# Patient Record
Sex: FEMALE | Race: WHITE | NOT HISPANIC OR LATINO | Employment: STUDENT | URBAN - NONMETROPOLITAN AREA
[De-identification: names, ages, dates, MRNs, and addresses within clinical notes are randomized per-mention and may not be internally consistent; named-entity substitution may affect disease eponyms.]

---
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
## 2024-06-03 DIAGNOSIS — L70.9 ACNE, UNSPECIFIED ACNE TYPE: ICD-10-CM

## 2024-06-03 DIAGNOSIS — N92.1 MENORRHAGIA WITH IRREGULAR CYCLE: ICD-10-CM

## 2024-07-18 ENCOUNTER — OFFICE VISIT (OUTPATIENT)
Dept: OBSTETRICS AND GYNECOLOGY | Age: 19
End: 2024-07-18
Payer: COMMERCIAL

## 2024-07-18 VITALS
HEIGHT: 63 IN | WEIGHT: 135 LBS | RESPIRATION RATE: 18 BRPM | BODY MASS INDEX: 23.92 KG/M2 | SYSTOLIC BLOOD PRESSURE: 120 MMHG | DIASTOLIC BLOOD PRESSURE: 70 MMHG

## 2024-07-18 DIAGNOSIS — N92.1 MENORRHAGIA WITH IRREGULAR CYCLE: ICD-10-CM

## 2024-07-18 DIAGNOSIS — Z00.00 WELL WOMAN EXAM WITHOUT GYNECOLOGICAL EXAM: Primary | ICD-10-CM

## 2024-07-18 DIAGNOSIS — L70.9 ACNE, UNSPECIFIED ACNE TYPE: ICD-10-CM

## 2024-07-18 PROBLEM — N39.44 NOCTURNAL ENURESIS: Status: ACTIVE | Noted: 2024-07-18

## 2024-07-18 RX ORDER — NORGESTIMATE AND ETHINYL ESTRADIOL 0.25-0.035
1 KIT ORAL DAILY
Qty: 84 TABLET | Refills: 3 | Status: SHIPPED | OUTPATIENT
Start: 2024-07-18

## 2024-07-18 NOTE — PROGRESS NOTES
Subjective   Jacinda Hare is a 18 y.o. female  YOB: 2005        Chief Complaint   Patient presents with    Gynecologic Exam     Patient presents today for well woman exam. And OCP refill.        Gynecologic Exam  The patient's pertinent negatives include no pelvic pain. Pertinent negatives include no abdominal pain, back pain, constipation, diarrhea, dysuria, fever, frequency, hematuria, nausea, rash, sore throat, urgency or vomiting.       The following portions of the patient's history were reviewed and updated as appropriate: allergies, current medications, past family history, past medical history, past social history, past surgical history, and problem list.    No Known Allergies    Past Medical History:   Diagnosis Date    Nasal septal deviation 05/18/2022    Personal history of COVID-19 01/2022       Family History   Problem Relation Age of Onset    No Known Problems Mother     Hypertension Father     No Known Problems Brother     Diabetes Paternal Grandmother     Stroke Paternal Grandmother     Hypertension Paternal Grandmother     No Known Problems Brother     Breast cancer Neg Hx     Colon cancer Neg Hx     Ovarian cancer Neg Hx     Uterine cancer Neg Hx     Melanoma Neg Hx        Social History     Socioeconomic History    Marital status: Single   Tobacco Use    Smoking status: Never    Smokeless tobacco: Never   Vaping Use    Vaping status: Never Used   Substance and Sexual Activity    Alcohol use: Never    Drug use: Never    Sexual activity: Never     Partners: Male         Current Outpatient Medications:     norgestimate-ethinyl estradiol (Sprintec 28) 0.25-35 MG-MCG per tablet, Take 1 tablet by mouth Daily., Disp: 84 tablet, Rfl: 3    Patient's last menstrual period was 07/09/2024 (exact date).    Sexual History:           Could not be calculated    Past Surgical History:   Procedure Laterality Date    KNEE ARTHROSCOPY W/ PLICA EXCISION Right     KNEE CARTILAGE SURGERY Right  02/04/2021    NASAL FRACTURE CLOSED REDUCTION N/A 06/20/2022    Procedure: NASAL FRACTURE CLOSED REDUCTION;  Surgeon: Harpreet Garcias MD;  Location:  [facility] OR;  Service: ENT;  Laterality: N/A;    TEAR DUCT SURGERY      TONSILLECTOMY Bilateral 07/24/2023    Procedure: TONSILLECTOMY;  Surgeon: Harpreet Garcias MD;  Location:  [facility] OR;  Service: ENT;  Laterality: Bilateral;       Review of Systems   Constitutional:  Negative for activity change, appetite change, fatigue, fever, unexpected weight gain and unexpected weight loss.   HENT:  Negative for congestion, ear pain, hearing loss, nosebleeds, rhinorrhea, sore throat, tinnitus and trouble swallowing.    Eyes:  Negative for blurred vision, pain, discharge, itching and visual disturbance.   Respiratory:  Negative for apnea, chest tightness, shortness of breath and wheezing.    Cardiovascular:  Negative for chest pain and leg swelling.   Gastrointestinal:  Negative for abdominal pain, blood in stool, constipation, diarrhea, nausea, vomiting and GERD.   Endocrine: Negative for heat intolerance, polydipsia and polyuria.   Genitourinary: Negative.  Negative for breast lump, decreased libido, difficulty urinating, dyspareunia, dysuria, frequency, genital sores, hematuria, menstrual problem, pelvic pain, urgency, urinary incontinence and vaginal pain.   Musculoskeletal:  Negative for arthralgias, back pain, joint swelling and myalgias.   Skin:  Negative for color change, rash and skin lesions.   Allergic/Immunologic: Negative for environmental allergies, food allergies and immunocompromised state.   Neurological:  Negative for dizziness, tremors, seizures, syncope, facial asymmetry, numbness and headache.   Hematological:  Negative for adenopathy. Does not bruise/bleed easily.   Psychiatric/Behavioral:  Negative for agitation, hallucinations, sleep disturbance, suicidal ideas and depressed mood. The patient is not nervous/anxious.        Objective   Physical  "Exam  Vitals and nursing note reviewed.   Constitutional:       Appearance: She is well-developed.   HENT:      Head: Normocephalic.   Eyes:      Pupils: Pupils are equal, round, and reactive to light.   Cardiovascular:      Rate and Rhythm: Normal rate and regular rhythm.   Pulmonary:      Effort: Pulmonary effort is normal.      Breath sounds: Normal breath sounds.   Abdominal:      Palpations: Abdomen is soft.   Musculoskeletal:         General: Normal range of motion.      Cervical back: Normal range of motion.   Skin:     General: Skin is warm and dry.   Neurological:      Mental Status: She is alert and oriented to person, place, and time.   Psychiatric:         Behavior: Behavior normal.           Vitals:    07/18/24 1304   BP: 120/70   Resp: 18   Weight: 61.2 kg (135 lb)   Height: 160 cm (63\")       Diagnoses and all orders for this visit:    1. Well woman exam without gynecological exam (Primary)  Comments:  Normal well woman exam.    2. Menorrhagia with irregular cycle  Comments:  Patient doing well on Sprintec related to menorrhagia and acne and wants to remain on it refill sent to pharmacy.  Orders:  -     norgestimate-ethinyl estradiol (Sprintec 28) 0.25-35 MG-MCG per tablet; Take 1 tablet by mouth Daily.  Dispense: 84 tablet; Refill: 3    3. Acne, unspecified acne type  -     norgestimate-ethinyl estradiol (Sprintec 28) 0.25-35 MG-MCG per tablet; Take 1 tablet by mouth Daily.  Dispense: 84 tablet; Refill: 3        Normal GYN exam. Will have lab work here. Encouraged SBE.  Pt is aware how to do self breast exam and the importance of same. Discussed weight management and importance of maintaining a healthy weight. Discussed Vitamin D intake and the importance of adequate vitamin D for both bone health and a healthy immune system.  Discussed daily exercise and the importance of same in regards to a healthy heart as well as helping to maintain her weight and improving her mental health.  Body mass index is " 23.91 kg/m². Colonoscopy is not age appropriate.  Mammogram will be scheduled at not age appropriate. Pap smear is done per ASCCP guidelines.    Pediatric BMI = 72 %ile (Z= 0.57) based on CDC (Girls, 2-20 Years) BMI-for-age based on BMI available as of 7/18/2024.. BMI is within normal parameters. No other follow-up for BMI required.             Non-Smoker    MyChart Instructions Given

## 2024-08-08 DIAGNOSIS — L70.9 ACNE, UNSPECIFIED ACNE TYPE: ICD-10-CM

## 2024-08-08 DIAGNOSIS — N92.1 MENORRHAGIA WITH IRREGULAR CYCLE: ICD-10-CM

## 2024-08-14 ENCOUNTER — PATIENT MESSAGE (OUTPATIENT)
Dept: OBSTETRICS AND GYNECOLOGY | Age: 19
End: 2024-08-14
Payer: COMMERCIAL

## 2024-08-14 DIAGNOSIS — L70.9 ACNE, UNSPECIFIED ACNE TYPE: ICD-10-CM

## 2024-08-14 DIAGNOSIS — N92.1 MENORRHAGIA WITH IRREGULAR CYCLE: ICD-10-CM

## 2024-08-15 RX ORDER — NORGESTIMATE AND ETHINYL ESTRADIOL 0.25-0.035
1 KIT ORAL DAILY
Qty: 84 TABLET | Refills: 3 | Status: SHIPPED | OUTPATIENT
Start: 2024-08-15

## 2024-08-15 NOTE — TELEPHONE ENCOUNTER
From: Jacinda Hare  To: Mercy Andrew  Sent: 8/14/2024 10:43 PM CDT  Subject: Birth Control Refill    Hello. At my last visit we changed my prescription to be filled through [facility], a mail in prescription. However, I am having issues with contacting them and getting the account set up in order to actually fill and receive the prescription. I was wanting to see if I could change the prescription to be filled at [facility] ([address] [place] [address], [place] [phone]. Thank you!

## 2024-10-04 NOTE — PROGRESS NOTES
Chief Complaint   Patient presents with   • Well Child     16 year        Jacinda Hare female 16 y.o. 2 m.o.      History was provided by the mother.    Immunization History   Administered Date(s) Administered   • COVID-19 (PFIZER) 07/30/2021, 08/27/2021   • DTaP 2004, 02/09/2005, 04/11/2005, 05/17/2006, 10/16/2008   • Flu Vaccine Quad PF >36MO 10/11/2018, 11/27/2019   • FluLaval/Fluarix/Fluzone >6 11/27/2019, 12/04/2020   • HPV Quadrivalent 10/11/2018   • Hepatitis A 10/16/2008, 06/09/2009   • Hepatitis B 2004, 2004, 02/09/2005, 04/11/2005   • HiB 2004, 02/09/2005, 04/11/2005, 10/18/2005   • Hpv9 10/11/2018, 06/05/2019, 08/06/2019   • IPV 2004, 02/09/2005, 04/11/2005, 10/16/2008   • MMR 10/18/2005, 10/16/2008   • Meningococcal Conjugate 10/09/2015, 12/04/2020   • PEDS-Pneumococcal Conjugate (PCV7) 2004, 02/09/2005, 04/11/2005, 10/18/2005   • Tdap 10/09/2015   • Varicella 10/18/2005, 10/16/2008       The following portions of the patient's history were reviewed and updated as appropriate: allergies, current medications, past family history, past medical history, past social history, past surgical history and problem list.     Current Outpatient Medications   Medication Sig Dispense Refill   • norgestimate-ethinyl estradiol (Sprintec 28) 0.25-35 MG-MCG per tablet Take 1 tablet by mouth Daily. 28 tablet 12   • fluticasone (Flonase) 50 MCG/ACT nasal spray 2 sprays into the nostril(s) as directed by provider Daily. 9.9 mL 5   • loratadine (Claritin) 10 MG tablet Take 1 tablet by mouth Daily. 30 tablet 5   • NON FORMULARY Floracream Compounded Vaginal Probiotic  1gr  Vaginally QD x 2 weeks, then as needed nightly after physical activity. 30 g 5     No current facility-administered medications for this visit.       No Known Allergies      Current Issues:  Current concerns include tonsil stones.    Review of Nutrition:  Current diet: regular  Balanced diet? yes  Exercise:  "daily  Dentist: twice a year    Social Screening:  Discipline concerns? no  Concerns regarding behavior with peers? no  School performance: doing well; no concerns  Grade: 11th grade  Secondhand smoke exposure? no  Sexual activity: no  Helmet Use:  yes  Seat Belt Use: yes  Sunscreen Use:  yes  Smoke Detectors:  yes  Alcohol or drug use: no     Review of Systems   Constitutional: Negative for appetite change, fatigue and fever.   HENT: Negative for congestion, ear pain, hearing loss, rhinorrhea, sneezing and sore throat.         Tonsils stones   Eyes: Negative for discharge, redness and visual disturbance.   Respiratory: Negative for cough and wheezing.    Cardiovascular: Negative for chest pain and palpitations.   Gastrointestinal: Negative for abdominal pain, constipation, diarrhea, nausea and vomiting.   Genitourinary: Negative for dysuria, frequency and hematuria.   Musculoskeletal: Negative for arthralgias and myalgias.   Skin: Negative for rash.   Neurological: Negative for headache.   Hematological: Negative for adenopathy.   Psychiatric/Behavioral: Negative for behavioral problems and sleep disturbance.              /80   Pulse 89   Temp 98.1 °F (36.7 °C)   Ht 160 cm (63\")   Wt 54.9 kg (121 lb)   SpO2 100%   BMI 21.43 kg/m²          Physical Exam  Vitals reviewed. Exam conducted with a chaperone present.   Constitutional:       Appearance: She is well-developed.   HENT:      Head: Normocephalic.      Right Ear: Tympanic membrane normal.      Left Ear: Tympanic membrane normal.      Nose: Nose normal. No rhinorrhea.      Right Sinus: No maxillary sinus tenderness or frontal sinus tenderness.      Left Sinus: No maxillary sinus tenderness or frontal sinus tenderness.   Eyes:      General: Lids are normal.      Conjunctiva/sclera: Conjunctivae normal.      Pupils: Pupils are equal, round, and reactive to light.   Cardiovascular:      Rate and Rhythm: Normal rate and regular rhythm.      Heart sounds: " Normal heart sounds.   Pulmonary:      Effort: Pulmonary effort is normal. No respiratory distress.      Breath sounds: Normal breath sounds. No wheezing, rhonchi or rales.   Abdominal:      General: Bowel sounds are normal. There is no distension.      Palpations: Abdomen is soft.      Tenderness: There is no abdominal tenderness. There is no guarding or rebound.   Musculoskeletal:         General: Normal range of motion.      Cervical back: Normal range of motion and neck supple.      Thoracic back: Normal. No deformity.   Lymphadenopathy:      Cervical: No cervical adenopathy.   Skin:     General: Skin is warm and dry.      Findings: No rash.   Neurological:      Mental Status: She is alert and oriented to person, place, and time.   Psychiatric:         Speech: Speech normal.         Behavior: Behavior normal.         Thought Content: Thought content normal.         Judgment: Judgment normal.                 Healthy 16 y.o.  well child.        1. Anticipatory guidance discussed.  Specific topics reviewed: bicycle helmets, drugs, ETOH, and tobacco, puberty and seat belts.    The patient and parent(s) were instructed in water safety, burn safety, firearm safety, and stranger safety.  Helmet use was indicated for any bike riding, scooter, rollerblades, skateboards, or skiing. They were instructed that children should sit  in the back seat of the car, if there is an air bag, until age 13.  Encouraged annual dental visits and appropriate dental hygiene.  Encouraged participation in household chores. Recommended limiting screen time to <2hrs daily and encouraging at least one hour of active play daily.  If participating in sports, use proper personal safety equipment.    Age appropriate counseling provided on smoking, alcohol use, illicit drug use, and sexual activity.    2.  Weight management:  The patient was counseled regarding behavior modifications, nutrition and physical activity.    3. Development: appropriate  for age    4.Immunizations: discussed risk/benefits to vaccinations ordered today, reviewed components of the vaccine, discussed CDC VIS, discussed informed consent and informed consent obtained. Counseled regarding s/s or adverse effects and when to seek medical attention.  Patient/family was allowed to accept or refuse vaccine. Questions answered to satisfactory state of patient. We reviewed typical age appropriate and seasonally appropriate vaccinations. Reviewed immunization history and updated state vaccination form as needed.    Assessment/Plan     Diagnoses and all orders for this visit:    1. Encounter for well child visit at 16 years of age (Primary)  -     POC Hemoglobin  -     FluLaval/Fluarix/Fluzone >6 Months    2. Tonsil stone  -     loratadine (Claritin) 10 MG tablet; Take 1 tablet by mouth Daily.  Dispense: 30 tablet; Refill: 5  -     fluticasone (Flonase) 50 MCG/ACT nasal spray; 2 sprays into the nostril(s) as directed by provider Daily.  Dispense: 9.9 mL; Refill: 5          Return in about 1 year (around 12/28/2022).              Yes

## 2025-05-21 ENCOUNTER — TELEPHONE (OUTPATIENT)
Dept: CARDIOLOGY | Facility: CLINIC | Age: 20
End: 2025-05-21
Payer: COMMERCIAL

## 2025-05-21 NOTE — TELEPHONE ENCOUNTER
Record request for cardiac records of last 2 visit notes, heart monitor report, stress test, and echo for the past year faxed to Dr. Jaguar Kaiser @ [phone].  His phone is:  [phone].  [facility] in [place], [place] phone is:  [phone].  Chelly Stoner MA

## 2025-06-03 ENCOUNTER — OFFICE VISIT (OUTPATIENT)
Dept: CARDIOLOGY | Facility: CLINIC | Age: 20
End: 2025-06-03
Payer: COMMERCIAL

## 2025-06-03 VITALS
HEART RATE: 102 BPM | DIASTOLIC BLOOD PRESSURE: 76 MMHG | HEIGHT: 63 IN | OXYGEN SATURATION: 98 % | WEIGHT: 137 LBS | BODY MASS INDEX: 24.27 KG/M2 | SYSTOLIC BLOOD PRESSURE: 106 MMHG

## 2025-06-03 DIAGNOSIS — R00.2 PALPITATIONS: ICD-10-CM

## 2025-06-03 DIAGNOSIS — G90.9 AUTONOMIC DYSFUNCTION: Primary | ICD-10-CM

## 2025-06-03 PROCEDURE — 99204 OFFICE O/P NEW MOD 45 MIN: CPT | Performed by: HOSPITALIST

## 2025-06-03 PROCEDURE — 93000 ELECTROCARDIOGRAM COMPLETE: CPT | Performed by: HOSPITALIST

## 2025-06-03 RX ORDER — LEVOCETIRIZINE DIHYDROCHLORIDE 5 MG/1
5 TABLET, FILM COATED ORAL EVERY EVENING
COMMUNITY

## 2025-06-03 RX ORDER — PROPRANOLOL HYDROCHLORIDE 10 MG/1
5 TABLET ORAL DAILY
Qty: 45 TABLET | Refills: 3 | Status: SHIPPED | OUTPATIENT
Start: 2025-06-03

## 2025-06-03 RX ORDER — DIGOXIN 125 MCG
62.5 TABLET ORAL
COMMUNITY
Start: 2025-03-28 | End: 2025-06-03

## 2025-06-03 NOTE — PROGRESS NOTES
Reason For Visit:  Palpitations    Subjective        Jacinda Hare is a 19 y.o. female with the below pertinent PMH  who is referred for the above issue.    I reviewed a cardiology note from [place] from 5/21/2025.  This stated that the patient had started recently experiencing episodes of tachycardia with mild activity.  She had been seen in urgent care once and prescribed propranolol, but stopped it because it dropped her BP too much and made her feel fatigued all day.  She also was seen in the ED where her TSH and free T4 were within normal limits but she had mild leukocytosis with WBC 11.  A cardiac monitor revealed an average HR in the 80s.  She had been started on digoxin 125 mcg daily, which she had stopped due to reporting that her HR would drop into the 40s.  She therefore was advised to take digoxin 62.5 mcg daily.    Today, the patient states that she started having issues in January.  Prior to that, she had been quite active with exercise and playing intermural sports without any significant issues.  In January she started to have random episodes of tachycardia with palpitations, shortness of breath, and chest discomfort.  She states that these occur randomly without clear triggers.  They can occur while she is walking or while she is seated.  She does not consistently notice them triggered by orthostatic changes.  She has not noticed any issues while she has been laying down.  She corroborated the above history.  She does state that since starting the 62.5 mcg of digoxin her HR has a tendency to run a little lower and symptoms may be very slightly better but not much of a difference.    ROS: Pertinent findings are included above.    Cardiac Studies  Cardiac monitor 3/16/2021: Predominantly sinus rhythm with average rate 81 bpm () BPM.  No atrial fibrillation or other significant arrhythmias.  No significant ectopy noted.  7-day cardiac event monitor 2/27/2025: Average rate 87 bpm.  Symptoms  "corresponded with sinus tachycardia.  No arrhythmias.  Echo 3/28/2025: LVEF 54-74%, normal diastolic function, normal RV size/function, normal biatrial size, no hemodynamically significant valvular dysfunction.  Stress echo 3/28/2025: Low risk for ischemia.    Pertinent PMH  Palpitations    Pertinent past medical, surgical, family, and social history were reviewed and updated in the EMR.      Current Outpatient Medications:     levocetirizine (XYZAL) 5 MG tablet, Take 1 tablet by mouth Every Evening., Disp: , Rfl:     norgestimate-ethinyl estradiol (Sprintec 28) 0.25-35 MG-MCG per tablet, Take 1 tablet by mouth Daily., Disp: 84 tablet, Rfl: 3    propranolol (INDERAL) 10 MG tablet, Take 0.5 tablets by mouth Daily., Disp: 45 tablet, Rfl: 3     Objective   Vital Signs:  /76   Pulse 102   Ht 160 cm (63\")   Wt 62.1 kg (137 lb)   SpO2 98%   BMI 24.27 kg/m²   Estimated body mass index is 24.27 kg/m² as calculated from the following:    Height as of this encounter: 160 cm (63\").    Weight as of this encounter: 62.1 kg (137 lb).      Constitutional:       Appearance: Healthy appearance. Not in distress.   Neck:      Vascular: JVD normal.   Pulmonary:      Effort: Pulmonary effort is normal.      Breath sounds: Normal breath sounds.   Cardiovascular:      Normal rate. Regular rhythm.      Murmurs: There is no murmur.      No gallop.  No click. No rub.   Edema:     Peripheral edema absent.   Abdominal:      General: There is no distension.      Palpations: Abdomen is soft.      Tenderness: There is no abdominal tenderness.   Skin:     General: Skin is warm and dry.   Neurological:      Mental Status: Alert and oriented to person, place and time.        Result Review :             ECG 12 Lead    Date/Time: 6/3/2025 3:50 PM  Performed by: Roberto Carlos Priest MD    Authorized by: Roberto Carlos Priest MD  Comparison: compared with previous ECG from 3/16/2021  Rhythm: sinus rhythm  Rate: normal  BPM: 88  Conduction: conduction " normal  QRS axis: right  Clinical impression comment: Borderline            Assessment and Plan   Diagnoses and all orders for this visit:    1. Autonomic dysfunction (Primary)    2. Palpitations    Other orders  -     propranolol (INDERAL) 10 MG tablet; Take 0.5 tablets by mouth Daily.  Dispense: 45 tablet; Refill: 3  -     ECG 12 Lead    Based on her symptoms I do believe that she has some autonomic dysfunction.  That being said, she does not clearly meet diagnostic criteria for inappropriate sinus tachycardia.  POTS is a consideration although her symptom description does not classically fit the picture for this either.  It does not appear that she is achieving adequate benefit from digoxin, so we agreed to discontinue this.  I counseled her  extensively on conservative measures including increasing fluid intake (currently drinking around 40 ounces of water daily) with goal to try to drink at minimum 64 ounces and ideally closer to 100 ounces of water daily.  Also counseled her to increase sodium intake and consider thigh-high compression stocking use.  I am sending a prescription for propranolol 5 mg daily for her to try if she is still having symptoms after several weeks of attempting conservative measures.  Hopefully she will be able to tolerate the lower dose of propranolol in conjunction with increased salt intake without a significant drop in BP.  We discussed the possibility of referral to EP but will defer this for now until reassessment with these measures.    Follow Up   Return in about 6 weeks (around 7/15/2025).  Patient was given instructions and counseling regarding her condition or for health maintenance advice. Please see specific information pulled into the AVS if appropriate.       Part of this note may be an electronic transcription/translation of spoken language to printed text using the Dragon Dictation System.    [employer]

## 2025-06-06 ENCOUNTER — PATIENT ROUNDING (BHMG ONLY) (OUTPATIENT)
Dept: CARDIOLOGY | Facility: CLINIC | Age: 20
End: 2025-06-06
Payer: COMMERCIAL

## 2025-06-06 NOTE — PROGRESS NOTES
A NMT Medical message has been sent to the patient for PATIENT ROUNDING with [facility].

## 2025-07-15 ENCOUNTER — OFFICE VISIT (OUTPATIENT)
Dept: CARDIOLOGY | Facility: CLINIC | Age: 20
End: 2025-07-15
Payer: COMMERCIAL

## 2025-07-15 VITALS
HEART RATE: 71 BPM | BODY MASS INDEX: 23.74 KG/M2 | DIASTOLIC BLOOD PRESSURE: 62 MMHG | SYSTOLIC BLOOD PRESSURE: 104 MMHG | WEIGHT: 134 LBS | HEIGHT: 63 IN | OXYGEN SATURATION: 98 %

## 2025-07-15 DIAGNOSIS — R00.2 PALPITATIONS: ICD-10-CM

## 2025-07-15 DIAGNOSIS — G90.9 AUTONOMIC DYSFUNCTION: Primary | ICD-10-CM

## 2025-07-15 PROCEDURE — 93000 ELECTROCARDIOGRAM COMPLETE: CPT | Performed by: HOSPITALIST

## 2025-07-15 PROCEDURE — 99213 OFFICE O/P EST LOW 20 MIN: CPT | Performed by: HOSPITALIST

## 2025-07-15 RX ORDER — METOPROLOL SUCCINATE 25 MG/1
12.5 TABLET, EXTENDED RELEASE ORAL EVERY EVENING
Qty: 30 TABLET | Refills: 5 | Status: SHIPPED | OUTPATIENT
Start: 2025-07-15

## 2025-07-15 NOTE — PROGRESS NOTES
Reason For Visit:  Autonomic dysfunction, palpitations    Subjective        Jacinda Hare is a 19 y.o. female with the below pertinent PMH who presents for follow-up of the above issues.    Jacinda Hare was most recently seen by me in clinic for initial evaluation 6/3/2025.  She reported symptoms including palpitations indicative of some autonomic dysfunction although did not clearly meet criteria for inappropriate sinus tachycardia and did not have classic symptoms of POTS.  She had previously been treated with digoxin that was helpful but not providing adequate benefit, so this was discontinued.  She was counseled on conservative measures including increasing fluid and sodium intake.  She was prescribed propranolol 5 mg daily.    Today, the patient reports that she has noticed some improvement since her last visit with increasing fluid intake and starting propranolol.  She initially was taking propranolol intermittently but tolerated it well and noticed some improvement in her palpitations, so she started taking it consistently every day.  This has been beneficial although she feels like it wears off midday.  She also has noticed gingival bleeding since starting to take it every day.  She does continue to have some palpitations, shortness of breath, and lightheadedness but finds that they are much more manageable.  She has tried increasing salt intake but has had difficulty with this.  She does mention a couple of more notable episodes of lightheadedness and fast heart rate when going from lying to sitting after being supine for a while or when for standing up.    ROS: Pertinent findings are included above.    Cardiac Studies  Cardiac monitor 3/16/2021: Predominantly sinus rhythm with average rate 81 bpm () BPM.  No atrial fibrillation or other significant arrhythmias.  No significant ectopy noted.  7-day cardiac event monitor 2/27/2025: Average rate 87 bpm.  Symptoms corresponded with sinus tachycardia.   "No arrhythmias.  Echo 3/28/2025: LVEF 54-74%, normal diastolic function, normal RV size/function, normal biatrial size, no hemodynamically significant valvular dysfunction.  Stress echo 3/28/2025: Low risk for ischemia.     Pertinent PMH  Palpitations  Autonomic dysfunction       Pertinent past medical, surgical, family, and social history were reviewed.      Current Outpatient Medications:     levocetirizine (XYZAL) 5 MG tablet, Take 1 tablet by mouth Every Evening., Disp: , Rfl:     norgestimate-ethinyl estradiol (Sprintec 28) 0.25-35 MG-MCG per tablet, Take 1 tablet by mouth Daily., Disp: 84 tablet, Rfl: 3    propranolol (INDERAL) 10 MG tablet, Take 0.5 tablets by mouth Daily., Disp: 45 tablet, Rfl: 3     Objective   Vital Signs:  /62   Pulse 71   Ht 160 cm (63\")   Wt 60.8 kg (134 lb)   SpO2 98%   BMI 23.74 kg/m²   Estimated body mass index is 23.74 kg/m² as calculated from the following:    Height as of this encounter: 160 cm (63\").    Weight as of this encounter: 60.8 kg (134 lb).      Constitutional:       Appearance: Healthy appearance. Not in distress.   Neck:      Vascular: JVD normal.   Pulmonary:      Effort: Pulmonary effort is normal.      Breath sounds: Normal breath sounds.   Cardiovascular:      Normal rate. Regular rhythm.      Murmurs: There is no murmur.      No gallop.  No click. No rub.   Edema:     Peripheral edema absent.   Abdominal:      General: There is no distension.      Palpations: Abdomen is soft.      Tenderness: There is no abdominal tenderness.   Skin:     General: Skin is warm and dry.   Neurological:      Mental Status: Alert and oriented to person, place and time.        Result Review :             ECG 12 Lead    Date/Time: 7/15/2025 12:17 PM  Performed by: Roberto Carlos Priest MD    Authorized by: Roberto Carlos Priest MD  Comparison: compared with previous ECG from 6/3/2025  Similar to previous ECG  Rhythm: sinus rhythm  Rate: normal  BPM: 71  Conduction: conduction normal  QRS " axis: right  Clinical impression comment: Borderline ECG            Assessment and Plan   Diagnoses and all orders for this visit:    1. Autonomic dysfunction (Primary)  -     Tilt Table; Future    2. Palpitations  -     Tilt Table; Future    Other orders  -     metoprolol succinate XL (TOPROL-XL) 25 MG 24 hr tablet; Take 0.5 tablets by mouth Every Evening.  Dispense: 30 tablet; Refill: 5  -     ECG 12 Lead      -Symptoms improved but persistent.  Description now raising more concerns for POTS.  She does appear to have gingival bleeding as a side effect from her propranolol.  - Discontinue propranolol and monitor for improvement in gingival bleeding.  Sending a prescription for Toprol-XL 12.5 mg daily for her to start taking qpm after gingival bleeding completely resolves; she will monitor for adverse effects.  - Continue increased fluid intake and attempt to try to increase sodium intake.  - Obtaining a tilt table test  - In the future, could consider addition of midodrine.    Follow Up   Return in about 4 months (around 10/30/2025).  Patient was given instructions and counseling regarding her condition or for health maintenance advice. Please see specific information pulled into the AVS if appropriate.       Part of this note may be an electronic transcription/translation of spoken language to printed text using the Dragon Dictation System.

## 2025-07-21 ENCOUNTER — OFFICE VISIT (OUTPATIENT)
Dept: OBSTETRICS AND GYNECOLOGY | Age: 20
End: 2025-07-21
Payer: COMMERCIAL

## 2025-07-21 VITALS
BODY MASS INDEX: 23.21 KG/M2 | DIASTOLIC BLOOD PRESSURE: 74 MMHG | WEIGHT: 131 LBS | HEIGHT: 63 IN | SYSTOLIC BLOOD PRESSURE: 108 MMHG

## 2025-07-21 DIAGNOSIS — L70.9 ACNE, UNSPECIFIED ACNE TYPE: ICD-10-CM

## 2025-07-21 DIAGNOSIS — N92.1 MENORRHAGIA WITH IRREGULAR CYCLE: ICD-10-CM

## 2025-07-21 PROCEDURE — 99213 OFFICE O/P EST LOW 20 MIN: CPT | Performed by: NURSE PRACTITIONER

## 2025-07-21 RX ORDER — NORGESTIMATE AND ETHINYL ESTRADIOL 0.25-0.035
1 KIT ORAL DAILY
Qty: 84 TABLET | Refills: 3 | Status: SHIPPED | OUTPATIENT
Start: 2025-07-21

## 2025-07-23 NOTE — PROGRESS NOTES
"Chief Complaint   Patient presents with    Gynecologic Exam     Patient here for yearly birth control check.   Patient denies any problems or concerns.        History:  Jacinda Hare is a 19 y.o. female who presents today for follow-up for evaluation of the above:  Pt comes in today to follow up on birth control. Doing well. Wishes to continue same.           ROS:  Review of Systems   Constitutional:  Negative for activity change and unexpected weight loss.   HENT:  Negative for congestion.    Cardiovascular:  Negative for chest pain.   Gastrointestinal:  Negative for blood in stool, constipation and diarrhea.   Endocrine: Negative for cold intolerance and heat intolerance.   Genitourinary:  Negative for dyspareunia, pelvic pain and vaginal discharge.   Musculoskeletal:  Negative for arthralgias, back pain, neck pain and neck stiffness.   Skin:  Negative for rash.   Neurological:  Negative for dizziness and headache.   Psychiatric/Behavioral:  Negative for sleep disturbance. The patient is not nervous/anxious.        Ms. Hare  reports that she has never smoked. She has never been exposed to tobacco smoke. She has never used smokeless tobacco. She reports current alcohol use of about 1.0 standard drink of alcohol per week. She reports that she does not use drugs.      Current Outpatient Medications:     levocetirizine (XYZAL) 5 MG tablet, Take 1 tablet by mouth Every Evening., Disp: , Rfl:     metoprolol succinate XL (TOPROL-XL) 25 MG 24 hr tablet, Take 0.5 tablets by mouth Every Evening., Disp: 30 tablet, Rfl: 5    norgestimate-ethinyl estradiol (Sprintec 28) 0.25-35 MG-MCG per tablet, Take 1 tablet by mouth Daily., Disp: 84 tablet, Rfl: 3      OBJECTIVE:  /74 (BP Location: Left arm, Patient Position: Sitting, Cuff Size: Adult)   Ht 160 cm (63\")   Wt 59.4 kg (131 lb)   LMP 07/15/2025   BMI 23.21 kg/m²    Physical Exam  Vitals and nursing note reviewed.   Constitutional:       Appearance: She is " well-developed.   HENT:      Head: Normocephalic and atraumatic.   Eyes:      General:         Right eye: No discharge.         Left eye: No discharge.      Conjunctiva/sclera: Conjunctivae normal.   Neck:      Thyroid: No thyromegaly.   Cardiovascular:      Rate and Rhythm: Normal rate and regular rhythm.      Heart sounds: Normal heart sounds. No murmur heard.  Pulmonary:      Effort: Pulmonary effort is normal.      Breath sounds: Normal breath sounds.   Musculoskeletal:      Cervical back: Normal range of motion and neck supple.   Skin:     General: Skin is warm and dry.   Neurological:      Mental Status: She is alert and oriented to person, place, and time.   Psychiatric:         Mood and Affect: Mood normal.         Behavior: Behavior normal.         Thought Content: Thought content normal.         Judgment: Judgment normal.         Assessment/Plan    Diagnoses and all orders for this visit:    1. Menorrhagia with irregular cycle  -     norgestimate-ethinyl estradiol (Sprintec 28) 0.25-35 MG-MCG per tablet; Take 1 tablet by mouth Daily.  Dispense: 84 tablet; Refill: 3    2. Acne, unspecified acne type  -     norgestimate-ethinyl estradiol (Sprintec 28) 0.25-35 MG-MCG per tablet; Take 1 tablet by mouth Daily.  Dispense: 84 tablet; Refill: 3           An After Visit Summary was printed and given to the patient at discharge.  Return in about 1 year (around 7/21/2026) for Annual physical. Sooner if problems arise.          FREDERICK Mckeon  Electronically Signed

## 2025-07-31 ENCOUNTER — HOSPITAL ENCOUNTER (OUTPATIENT)
Dept: CARDIOLOGY | Facility: HOSPITAL | Age: 20
Discharge: HOME | End: 2025-07-31
Admitting: HOSPITALIST
Payer: COMMERCIAL

## 2025-07-31 VITALS
DIASTOLIC BLOOD PRESSURE: 66 MMHG | HEIGHT: 63 IN | SYSTOLIC BLOOD PRESSURE: 126 MMHG | HEART RATE: 101 BPM | WEIGHT: 135 LBS | BODY MASS INDEX: 23.92 KG/M2

## 2025-07-31 DIAGNOSIS — R00.2 PALPITATIONS: ICD-10-CM

## 2025-07-31 DIAGNOSIS — G90.9 AUTONOMIC DYSFUNCTION: ICD-10-CM

## 2025-07-31 PROCEDURE — 93660 TILT TABLE EVALUATION: CPT

## 2025-07-31 RX ORDER — SODIUM CHLORIDE 0.9 % (FLUSH) 0.9 %
10 SYRINGE (ML) INJECTION AS NEEDED
Status: DISCONTINUED | OUTPATIENT
Start: 2025-07-31 | End: 2025-08-01 | Stop reason: HOSPADM

## 2025-07-31 RX ORDER — SODIUM CHLORIDE 0.9 % (FLUSH) 0.9 %
10 SYRINGE (ML) INJECTION EVERY 12 HOURS SCHEDULED
Status: DISCONTINUED | OUTPATIENT
Start: 2025-07-31 | End: 2025-08-01 | Stop reason: HOSPADM

## 2025-08-04 ENCOUNTER — RESULTS FOLLOW-UP (OUTPATIENT)
Dept: CARDIOLOGY | Facility: CLINIC | Age: 20
End: 2025-08-04
Payer: COMMERCIAL

## (undated) DEVICE — ADHS LIQ MASTISOL 2/3ML

## (undated) DEVICE — PK TURNOVER RM ADV

## (undated) DEVICE — 4-PORT MANIFOLD: Brand: NEPTUNE 2

## (undated) DEVICE — GLV SURG BIOGEL M LTX PF 7 1/2

## (undated) DEVICE — PAD T&A PACK: Brand: MEDLINE INDUSTRIES, INC.

## (undated) DEVICE — EVAC 70 XTRA HP WAND: Brand: COBLATION

## (undated) DEVICE — SPONGE,NEURO,0.5"X3",XR,STRL,LF,10/PK: Brand: MEDLINE

## (undated) DEVICE — TUBING, SUCTION, 1/4" X 12', STRAIGHT: Brand: MEDLINE

## (undated) DEVICE — STANDARD HYPODERMIC NEEDLE,POLYPROPYLENE HUB: Brand: MONOJECT

## (undated) DEVICE — ELECTRD BLD EZ CLN MOD XLNG 2.75IN

## (undated) DEVICE — HDRST POSITIONING FM RND 2X9IN

## (undated) DEVICE — CATHETER,URETHRAL,REDRUBBER,STRL,10FR: Brand: MEDLINE INDUSTRIES, INC.

## (undated) DEVICE — ELECTRD BLD BOVIE WECK NO INSULATION

## (undated) DEVICE — SURGICAL SUCTION CONNECTING TUBE WITH MALE CONNECTOR AND SUCTION CLAMP, 2 FT. LONG (.6 M), 5 MM I.D.: Brand: CONMED

## (undated) DEVICE — TOWEL,OR,DSP,ST,BLUE,STD,4/PK,20PK/CS: Brand: MEDLINE